# Patient Record
Sex: FEMALE
[De-identification: names, ages, dates, MRNs, and addresses within clinical notes are randomized per-mention and may not be internally consistent; named-entity substitution may affect disease eponyms.]

---

## 2017-12-20 NOTE — MAMMOGRAPHY REPORT
BILATERAL DIGITAL SCREENING MAMMOGRAM with CAD : 12/19/17 10:30:00



CLINICAL: Routine screening.



COMPARISON:12/16/16



FINDINGS: The breasts are heterogeneously dense, which may obscure 

small masses.Bilateral scattered benign calcifications. No mass, 

architectural distortion or suspicious calcifications.



IMPRESSION: No mammographic evidence of malignancy.



BI-RADS CATEGORY:  2 -- Benign



RECOMMENDATION: Routine mammographic screening in one year.



COMMENT:

Patient follow-up letters are generated by our [employer] application.

## 2019-01-21 ENCOUNTER — HOSPITAL ENCOUNTER (OUTPATIENT)
Dept: HOSPITAL 5 - SPVWC | Age: 54
Discharge: HOME | End: 2019-01-21
Attending: OBSTETRICS & GYNECOLOGY
Payer: COMMERCIAL

## 2019-01-21 DIAGNOSIS — M85.88: ICD-10-CM

## 2019-01-21 DIAGNOSIS — Z78.0: ICD-10-CM

## 2019-01-21 DIAGNOSIS — Z13.820: Primary | ICD-10-CM

## 2019-01-21 PROCEDURE — 77080 DXA BONE DENSITY AXIAL: CPT

## 2019-01-21 NOTE — MAMMOGRAPHY REPORT
BONE DEXA:01/21/19 09:37:00



CLINICAL: Postmenopausal. No comparison.



TECHNIQUE: Two site bone DEXA performed on an Hologic scanner.





FINDINGS:

The average BMD of the lumbar spine L1-L4 is 0.881g/cm squared with a 

T-score of -1.5 and a Z-score of -0.6.



The average BMD of the left hip is 0.809g/cm squared with a T-score of 

-1.1 and a Z-score of -0.5.





IMPRESSION:

WHO classification: Osteopenia with increased fracture risk  based on 

both spine and left hip measurements.









RECOMMENDATION: Clinical correlation and routine screening.





DEFINITIONS:

  BMD     = Bone Mineral Density

  T-score = BMD related to mean peak bone mass of young adult

            (mean expressed in Standard Deviation)

  Z-score = Age matched BMD expressed in SD



World Health Organization (WHO) Diagnostic Criteria

  Normal             T-score > -1 SD

  Osteopenia      T-score between -1 and -2.4 SD

  Osteoporosis    T-score -2.5 SD or below



NOTE:

      BMD is not the only risk factor for fracture. One should also 

consider factors such as the patient's age, risk of falling, previous 

osteoporotic fracture, family history of osteoporotic fractures, 

current smoker, and low body weight.

Z-scores are not calculated if >80 years of age.

## 2021-03-02 ENCOUNTER — HOSPITAL ENCOUNTER (OUTPATIENT)
Dept: HOSPITAL 5 - SPVWC | Age: 56
Discharge: HOME | End: 2021-03-02
Attending: OBSTETRICS & GYNECOLOGY
Payer: COMMERCIAL

## 2021-03-02 DIAGNOSIS — R92.8: ICD-10-CM

## 2021-03-02 DIAGNOSIS — N63.21: ICD-10-CM

## 2021-03-02 DIAGNOSIS — N60.02: Primary | ICD-10-CM

## 2021-03-02 PROCEDURE — 77066 DX MAMMO INCL CAD BI: CPT

## 2021-03-02 NOTE — MAMMOGRAPHY REPORT
BILATERAL DIGITAL DIAGNOSTIC MAMMOGRAM WITH CAD , 3/2/2021

LEFT LIMITED BREAST ULTRASOUND

 

CLINICAL INFORMATION / INDICATION: The patient reports pain and lumpiness of the upper outer left renetta
ast for approximately 4 months. She is unable to specify a focal area of palpable concern on today's 
evaluation.



TECHNIQUE: Digital bilateral mammographic imaging was performed. Spot compression views were obtained
. Limited ultrasound was performed. This examination was interpreted with the benefit of Computer-Aid
ed Detection (CAD) analysis. 



COMPARISON: 12/19/2017, 12/16/2016, 12/5/2016



FINDINGS: 



Breast Density: The breasts are extremely dense, which lowers the sensitivity of mammography.



MAMMOGRAPHIC FINDINGS: No dominant mass, suspicious calcifications, or architectural distortion in ei
ther breast. Spot compression views of the upper outer left breast patient's area of palpable concern
 demonstrate no focal abnormality.



ULTRASOUND FINDINGS: Targeted ultrasound evaluation was performed of the area of interest.   Sonograp
hic evaluation of the upper outer left breast in the patient's area of clinical concern demonstrates 
a few small scattered cysts and fibrocystic change. The largest cluster of cysts is located at the 1:
00 position 2 cm from the nipple and measures 1 cm. There is no evidence of suspicious solid mass or 
shadowing.





IMPRESSION: No mammographic or sonographic evidence of malignancy. Specifically, no focal mammographi
c or sonographic abnormality is seen to correspond to the patient's area of pain and lumpiness in the
 left breast. Therefore, clinical correlation is recommended.



Follow up recommendation: Clinical exam



BI-RADS Category 2:  Benign.



-------------------------------------------------------------------------------------------

A "normal" or negative report should not discourage follow up or biopsy of a clinically significant f
inding.



A written summary of these findings will be mailed to the patient. The patient will be entered into a
 mammography reporting system which will generate a reminder letter for the patient's next appointmen
t at the appropriate interval.



According to the American College of Radiology, yearly mammograms are recommended starting at age 40 
and continuing as long as a woman is in good health.  Breast MRI is recommended for women with an tessa
roximately 20-25% or greater lifetime risk of breast cancer, including women with a strong family his
tory of breast or ovarian cancer and women who have been treated for Hodgkin's disease.



Signer Name: Ngoc Walsh MD 

Signed: 3/2/2021 11:07 AM

Workstation Name: [email or web address]

## 2021-03-02 NOTE — ULTRASOUND REPORT
BILATERAL DIGITAL DIAGNOSTIC MAMMOGRAM WITH CAD , 3/2/2021

LEFT LIMITED BREAST ULTRASOUND

 

CLINICAL INFORMATION / INDICATION: The patient reports pain and lumpiness of the upper outer left renetta
ast for approximately 4 months. She is unable to specify a focal area of palpable concern on today's 
evaluation.



TECHNIQUE: Digital bilateral mammographic imaging was performed. Spot compression views were obtained
. Limited ultrasound was performed. This examination was interpreted with the benefit of Computer-Aid
ed Detection (CAD) analysis. 



COMPARISON: 12/19/2017, 12/16/2016, 12/5/2016



FINDINGS: 



Breast Density: The breasts are extremely dense, which lowers the sensitivity of mammography.



MAMMOGRAPHIC FINDINGS: No dominant mass, suspicious calcifications, or architectural distortion in ei
ther breast. Spot compression views of the upper outer left breast patient's area of palpable concern
 demonstrate no focal abnormality.



ULTRASOUND FINDINGS: Targeted ultrasound evaluation was performed of the area of interest.   Sonograp
hic evaluation of the upper outer left breast in the patient's area of clinical concern demonstrates 
a few small scattered cysts and fibrocystic change. The largest cluster of cysts is located at the 1:
00 position 2 cm from the nipple and measures 1 cm. There is no evidence of suspicious solid mass or 
shadowing.





IMPRESSION: No mammographic or sonographic evidence of malignancy. Specifically, no focal mammographi
c or sonographic abnormality is seen to correspond to the patient's area of pain and lumpiness in the
 left breast. Therefore, clinical correlation is recommended.



Follow up recommendation: Clinical exam



BI-RADS Category 2:  Benign.



-------------------------------------------------------------------------------------------

A "normal" or negative report should not discourage follow up or biopsy of a clinically significant f
inding.



A written summary of these findings will be mailed to the patient. The patient will be entered into a
 mammography reporting system which will generate a reminder letter for the patient's next appointmen
t at the appropriate interval.



According to the American College of Radiology, yearly mammograms are recommended starting at age 40 
and continuing as long as a woman is in good health.  Breast MRI is recommended for women with an tessa
roximately 20-25% or greater lifetime risk of breast cancer, including women with a strong family his
tory of breast or ovarian cancer and women who have been treated for Hodgkin's disease.



Signer Name: Ngoc Walsh MD 

Signed: 3/2/2021 11:07 AM

Workstation Name: [email or web address]

## 2021-03-09 ENCOUNTER — HOSPITAL ENCOUNTER (OUTPATIENT)
Dept: HOSPITAL 5 - SPVWC | Age: 56
Discharge: HOME | End: 2021-03-09
Attending: OBSTETRICS & GYNECOLOGY
Payer: COMMERCIAL

## 2021-03-09 DIAGNOSIS — M85.88: ICD-10-CM

## 2021-03-09 DIAGNOSIS — Z13.820: Primary | ICD-10-CM

## 2021-03-09 PROCEDURE — 77080 DXA BONE DENSITY AXIAL: CPT

## 2021-03-09 NOTE — MAMMOGRAPHY REPORT
DEXA BONE DENSITY SCAN



INDICATION / CLINICAL INFORMATION:

SCREENING FOR OSTEOPOROSIS.

55 years Female



COMPARISON: 

1/21/2019



LUMBAR SPINE, L1-L4:

- Bone mineral density (BMD) = 0.848 g/cm2.

- T-score = -1.8 

- Z-score = -0.7 



Change (%) since most recent prior (if available):  3.8% decrease





LEFT HIP, TOTAL :

- Bone mineral density (BMD) = 0.786 g/cm2.

- T-score = -1.3 

- Z-score = 0.6 



Change (%) since most recent prior (if available):  2.9% decrease







IMPRESSION:

1. WHO Classification: Osteopenia. Fracture Risk: Increased.





Note: 10-Year Fracture Risk (FRAX) not reported. This DEXA unit lacks FRAX functionality.







=================================================================

BMD Reporting Guidelines (ISCD, 2015)

=================================================================

BMD Reporting in Postmenopausal Women and in Men Age 50 and Older

- T-scores are preferred.

- The WHO densitometric classification is applicable.



BMD Reporting in Females Prior to Menopause and in Males Younger Than Age 50

- Z-scores, not T-scores, are preferred. This is particularly important in children.

- A Z-score of -2.0 or lower is defined as below the expected range for age, and a Z-score above -2.0
 is within the expected range for age.

- Osteoporosis cannot be diagnosed in men under age 50 on the basis of BMD alone.

- The WHO diagnostic criteria may be applied to women in the menopausal transition.





http://www.iscd.org/official-positions/2274-smin-dwtvgnva-positions-adult/





Signer Name: Td Miller MD 

Signed: 3/9/2021 10:13 AM

Workstation Name: [email or web address]

## 2022-01-18 ENCOUNTER — HOSPITAL ENCOUNTER (OUTPATIENT)
Dept: HOSPITAL 5 - SPVWC | Age: 57
Discharge: HOME | End: 2022-01-18
Attending: OBSTETRICS & GYNECOLOGY
Payer: COMMERCIAL

## 2022-01-18 DIAGNOSIS — N64.4: Primary | ICD-10-CM

## 2022-01-18 DIAGNOSIS — R92.8: ICD-10-CM

## 2022-01-18 PROCEDURE — 77066 DX MAMMO INCL CAD BI: CPT

## 2022-01-18 PROCEDURE — G0279 TOMOSYNTHESIS, MAMMO: HCPCS

## 2022-01-18 NOTE — MAMMOGRAPHY REPORT
DIGITAL DIAGNOSTIC MAMMOGRAM WITH CAD WITH TOMOSYNTHESIS, 1/18/2022



CLINICAL INFORMATION / INDICATION: Pain BREAST PAIN N64.4



TECHNIQUE:  Digital bilateral mammographic imaging was performed.

This examination was interpreted with the benefit of Computer-aided Detection analysis. 



COMPARISON: March 2, 2021, December 19, 2017, December 5, 2016



FINDINGS: 



Breast Density: The breasts are heterogeneously dense, which may obscure small masses.



No dominant mass, suspicious calcifications or architectural distortion in either breast. 







IMPRESSION: No mammographic evidence of malignancy.



Follow up recommendation: Routine yearly



BI-RADS Category 1:  NEGATIVE.



Signer Name: Maximino Boss DO 

Signed: 1/18/2022 10:42 AM

Workstation Name: [email or web address]

## 2022-01-18 NOTE — MAMMOGRAPHY REPORT
DIGITAL DIAGNOSTIC MAMMOGRAM WITH CAD WITH TOMOSYNTHESIS, 1/18/2022



CLINICAL INFORMATION / INDICATION: Pain BREAST PAIN N64.4



TECHNIQUE:  Digital bilateral mammographic imaging was performed.

This examination was interpreted with the benefit of Computer-aided Detection analysis. 



COMPARISON: March 2, 2021, December 19, 2017, December 5, 2016



FINDINGS: 



Breast Density: The breasts are heterogeneously dense, which may obscure small masses.



No dominant mass, suspicious calcifications or architectural distortion in either breast. 







IMPRESSION: No mammographic evidence of malignancy.



Follow up recommendation: Routine yearly



BI-RADS Category 1:  NEGATIVE.



-------------------------------------------------------------------------------------------

A "normal" or negative report should not discourage follow up or biopsy of a clinically significant f
inding.



A written summary of these findings will be mailed to the patient. The patient will be entered into a
 mammography reporting system which will generate a reminder letter for the patient's next appointmen
t at the appropriate interval.



According to the American College of Radiology, yearly mammograms are recommended starting at age 40 
and continuing as long as a woman is in good health.  Breast MRI is recommended for women with an tessa
roximately 20-25% or greater lifetime risk of breast cancer, including women with a strong family his
tory of breast or ovarian cancer and women who have been treated for Hodgkin's disease.



Signer Name: Maximino Boss DO 

Signed: 1/18/2022 10:52 AM

Workstation Name: [email or web address]

## 2022-09-20 ENCOUNTER — TELEPHONE ENCOUNTER (OUTPATIENT)
Dept: URBAN - METROPOLITAN AREA CLINIC 23 | Facility: CLINIC | Age: 57
End: 2022-09-20

## 2022-10-07 ENCOUNTER — OFFICE VISIT (OUTPATIENT)
Dept: URBAN - METROPOLITAN AREA CLINIC 118 | Facility: CLINIC | Age: 57
End: 2022-10-07

## 2022-11-14 ENCOUNTER — OFFICE VISIT (OUTPATIENT)
Dept: URBAN - METROPOLITAN AREA CLINIC 118 | Facility: CLINIC | Age: 57
End: 2022-11-14

## 2022-11-22 ENCOUNTER — OFFICE VISIT (OUTPATIENT)
Dept: URBAN - METROPOLITAN AREA CLINIC 118 | Facility: CLINIC | Age: 57
End: 2022-11-22
Payer: COMMERCIAL

## 2022-11-22 ENCOUNTER — DASHBOARD ENCOUNTERS (OUTPATIENT)
Age: 57
End: 2022-11-22

## 2022-11-22 ENCOUNTER — LAB OUTSIDE AN ENCOUNTER (OUTPATIENT)
Dept: URBAN - METROPOLITAN AREA CLINIC 118 | Facility: CLINIC | Age: 57
End: 2022-11-22

## 2022-11-22 VITALS
HEART RATE: 74 BPM | HEIGHT: 69 IN | WEIGHT: 162 LBS | BODY MASS INDEX: 23.99 KG/M2 | DIASTOLIC BLOOD PRESSURE: 69 MMHG | TEMPERATURE: 97.7 F | SYSTOLIC BLOOD PRESSURE: 111 MMHG

## 2022-11-22 DIAGNOSIS — K92.89 GAS BLOAT SYNDROME: ICD-10-CM

## 2022-11-22 DIAGNOSIS — R10.10 UPPER ABDOMINAL PAIN: ICD-10-CM

## 2022-11-22 DIAGNOSIS — R19.4 CHANGE IN BOWEL HABITS: ICD-10-CM

## 2022-11-22 DIAGNOSIS — Z12.11 SCREEN FOR COLON CANCER: ICD-10-CM

## 2022-11-22 DIAGNOSIS — K21.9 GASTROESOPHAGEAL REFLUX DISEASE WITHOUT ESOPHAGITIS: ICD-10-CM

## 2022-11-22 PROCEDURE — 99204 OFFICE O/P NEW MOD 45 MIN: CPT | Performed by: INTERNAL MEDICINE

## 2022-11-22 RX ORDER — TRIAMCINOLONE ACETONIDE 55 UG/1
SPRAY, METERED NASAL
Qty: 1 | Refills: 0 | Status: ACTIVE | COMMUNITY
Start: 1900-01-01

## 2022-11-22 NOTE — HPI-TODAY'S VISIT:
pt presents for GI evaluation. pt notes h/o H. pylori in the past, treated. pt reports recent months noted progressive upper abdominal symptoms. pt notes worsening reflux with burning epigastric pain with eating. pt also notes gas, bloating with abdominal cramping. pt denies recent diet or med changes. No nsaids/asa. Pt also notes loose stools with abdominal cramping felt due to stress. pt no weight loss or anemia. pt concerned for food allergies and glutein sensitivity.

## 2022-11-23 PROBLEM — 266435005: Status: ACTIVE | Noted: 2022-11-22

## 2022-11-25 LAB
ABSOLUTE BASOPHILS: 33
ABSOLUTE EOSINOPHILS: 150
ABSOLUTE LYMPHOCYTES: 3218
ABSOLUTE MONOCYTES: 358
ABSOLUTE NEUTROPHILS: 2743
ALBUMIN/GLOBULIN RATIO: 2
ALBUMIN: 4.5
ALKALINE PHOSPHATASE: 49
ALMOND (F20) IGE: <0.1
ALT: 18
AST: 18
BASOPHILS: 0.5
BILIRUBIN, TOTAL: 0.8
BUN/CREATININE RATIO: (no result)
CALCIUM: 9.3
CARBON DIOXIDE: 28
CASHEW NUT (F202) IGE: <0.1
CHLORIDE: 103
CLASS: 0
CODFISH (F3) IGE: <0.1
COW'S MILK (F2) IGE: <0.1
CREATININE: 0.71
EGFR: 100
EGG WHITE (F1) IGE: <0.1
EOSINOPHILS: 2.3
FIB 4 INDEX: 1.04
FIB 4 INTERPRETATION: (no result)
GLOBULIN: 2.3
GLUCOSE: 133
HAZELNUT (F17) IGE: <0.1
HEMATOCRIT: 40.3
HEMOGLOBIN: 13.5
IMMUNOGLOBULIN A: 211
INTERPRETATION: (no result)
INTERPRETATION: (no result)
LYMPHOCYTES: 49.5
MCH: 32.8
MCHC: 33.5
MCV: 97.8
MONOCYTES: 5.5
MPV: 11.3
NEUTROPHILS: 42.2
PEANUT (F13) IGE: <0.1
PLATELET COUNT: 229
PLATELET COUNT: 229
POTASSIUM: 3.9
PROTEIN, TOTAL: 6.8
RDW: 11.8
RED BLOOD CELL COUNT: 4.12
SALMON (F41) IGE: <0.1
SCALLOP (F338) IGE: <0.1
SESAME SEED (F10) IGE: <0.1
SHRIMP (F24) IGE: <0.1
SODIUM: 141
SOYBEAN (F14) IGE: <0.1
TISSUE TRANSGLUTAMINASE AB, IGA: <1
TSH: 1.7
TUNA (F40) IGE: <0.1
UREA NITROGEN (BUN): 14
WALNUT (F256) IGE: <0.1
WHEAT (F4) IGE: <0.1
WHITE BLOOD CELL COUNT: 6.5

## 2022-11-30 ENCOUNTER — TELEPHONE ENCOUNTER (OUTPATIENT)
Dept: URBAN - METROPOLITAN AREA CLINIC 92 | Facility: CLINIC | Age: 57
End: 2022-11-30

## 2022-12-21 ENCOUNTER — CLAIMS CREATED FROM THE CLAIM WINDOW (OUTPATIENT)
Dept: URBAN - METROPOLITAN AREA SURGERY CENTER 23 | Facility: SURGERY CENTER | Age: 57
End: 2022-12-21
Payer: COMMERCIAL

## 2022-12-21 ENCOUNTER — CLAIMS CREATED FROM THE CLAIM WINDOW (OUTPATIENT)
Dept: URBAN - METROPOLITAN AREA SURGERY CENTER 23 | Facility: SURGERY CENTER | Age: 57
End: 2022-12-21

## 2022-12-21 DIAGNOSIS — K31.A11 INTESTINAL METAPLASIA OF ANTRUM OF STOMACH WITHOUT DYSPLASIA: ICD-10-CM

## 2022-12-21 DIAGNOSIS — K29.60 ADENOPAPILLOMATOSIS GASTRICA: ICD-10-CM

## 2022-12-21 DIAGNOSIS — Z12.11 COLON CANCER SCREENING: ICD-10-CM

## 2022-12-21 DIAGNOSIS — B96.81 BACTERIAL INFECTION DUE TO H. PYLORI: ICD-10-CM

## 2022-12-21 DIAGNOSIS — K21.00 ALKALINE REFLUX ESOPHAGITIS: ICD-10-CM

## 2022-12-21 PROCEDURE — 45378 DIAGNOSTIC COLONOSCOPY: CPT | Performed by: INTERNAL MEDICINE

## 2022-12-21 PROCEDURE — G8907 PT DOC NO EVENTS ON DISCHARG: HCPCS | Performed by: INTERNAL MEDICINE

## 2022-12-21 PROCEDURE — 43239 EGD BIOPSY SINGLE/MULTIPLE: CPT | Performed by: INTERNAL MEDICINE

## 2022-12-21 RX ORDER — TRIAMCINOLONE ACETONIDE 55 UG/1
SPRAY, METERED NASAL
Qty: 1 | Refills: 0 | Status: ACTIVE | COMMUNITY
Start: 1900-01-01

## 2023-01-12 ENCOUNTER — TELEPHONE ENCOUNTER (OUTPATIENT)
Dept: URBAN - METROPOLITAN AREA CLINIC 92 | Facility: CLINIC | Age: 58
End: 2023-01-12

## 2023-01-12 RX ORDER — AMOXICILLIN 500 MG/1
1 CAPSULE CAPSULE ORAL
Qty: 28 CAPSULE | OUTPATIENT
Start: 2023-01-12 | End: 2023-01-26

## 2023-01-12 RX ORDER — OMEPRAZOLE 20 MG/1
1 CAPSULE BY MOUTH CAPSULE, DELAYED RELEASE ORAL TWICE A DAY
Qty: 28 CAPSULE | Refills: 0 | OUTPATIENT
Start: 2023-01-12

## 2023-01-12 RX ORDER — CLARITHROMYCIN 500 MG/1
1 TABLET TABLET, FILM COATED ORAL
Qty: 28 TABLET | OUTPATIENT
Start: 2023-01-12 | End: 2023-01-26

## 2023-01-13 PROBLEM — 1027000 ALKALINE REFLUX ESOPHAGITIS: Status: ACTIVE | Noted: 2023-01-13

## 2024-05-20 ENCOUNTER — TELEPHONE ENCOUNTER (OUTPATIENT)
Dept: URBAN - METROPOLITAN AREA CLINIC 118 | Facility: CLINIC | Age: 59
End: 2024-05-20

## 2024-05-24 ENCOUNTER — OFFICE VISIT (OUTPATIENT)
Dept: URBAN - METROPOLITAN AREA CLINIC 118 | Facility: CLINIC | Age: 59
End: 2024-05-24
Payer: COMMERCIAL

## 2024-05-24 ENCOUNTER — LAB OUTSIDE AN ENCOUNTER (OUTPATIENT)
Dept: URBAN - METROPOLITAN AREA CLINIC 118 | Facility: CLINIC | Age: 59
End: 2024-05-24

## 2024-05-24 VITALS
HEIGHT: 68 IN | BODY MASS INDEX: 23.79 KG/M2 | TEMPERATURE: 97 F | DIASTOLIC BLOOD PRESSURE: 64 MMHG | SYSTOLIC BLOOD PRESSURE: 117 MMHG | WEIGHT: 157 LBS | HEART RATE: 87 BPM

## 2024-05-24 DIAGNOSIS — R13.19 ESOPHAGEAL DYSPHAGIA: ICD-10-CM

## 2024-05-24 DIAGNOSIS — K21.9 GASTROESOPHAGEAL REFLUX DISEASE WITHOUT ESOPHAGITIS: ICD-10-CM

## 2024-05-24 PROCEDURE — 99214 OFFICE O/P EST MOD 30 MIN: CPT | Performed by: INTERNAL MEDICINE

## 2024-05-24 RX ORDER — OMEPRAZOLE 20 MG/1
1 CAPSULE BY MOUTH CAPSULE, DELAYED RELEASE ORAL TWICE A DAY
Qty: 28 CAPSULE | Refills: 0 | Status: ACTIVE | COMMUNITY
Start: 2023-01-12

## 2024-05-24 RX ORDER — TRIAMCINOLONE ACETONIDE 55 UG/1
SPRAY, METERED NASAL
Qty: 1 | Refills: 0 | Status: ACTIVE | COMMUNITY
Start: 1900-01-01

## 2024-05-24 RX ORDER — PANTOPRAZOLE SODIUM 40 MG/1
1 TABLET TABLET, DELAYED RELEASE ORAL ONCE A DAY
Qty: 30 TABLET | Refills: 5 | OUTPATIENT
Start: 2024-05-24

## 2024-05-25 LAB
% SATURATION: 31
FERRITIN: 103
FOLATE, SERUM: 23.6
IRON BINDING CAPACITY: 298
IRON, TOTAL: 92
VITAMIN B12: 683

## 2024-08-01 ENCOUNTER — TELEPHONE ENCOUNTER (OUTPATIENT)
Dept: URBAN - METROPOLITAN AREA CLINIC 117 | Facility: CLINIC | Age: 59
End: 2024-08-01

## 2024-08-09 ENCOUNTER — OFFICE VISIT (OUTPATIENT)
Dept: URBAN - METROPOLITAN AREA SURGERY CENTER 23 | Facility: SURGERY CENTER | Age: 59
End: 2024-08-09